# Patient Record
Sex: FEMALE | Race: WHITE | NOT HISPANIC OR LATINO
[De-identification: names, ages, dates, MRNs, and addresses within clinical notes are randomized per-mention and may not be internally consistent; named-entity substitution may affect disease eponyms.]

---
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
## 2025-03-14 NOTE — DATA REVIEWED
[de-identified] : My review and interpretation of the radiologic studies:\par 11/2/21, outside facility radiographs of the right ankle: within normal limits. Talar Dome is well reduced within ankle mortis. No medial clear-space widening. No osseous abnormalities. \par \par September 2021: XR Cervical Spine: AP and Lateral views (including flex/ex) demonstrate no obvious fractures or dislocations of the cervical spine. No evidence of instability is present.\par \par  ABDOMINAL PAIN

## 2025-05-27 ENCOUNTER — RX RENEWAL (OUTPATIENT)
Age: 20
End: 2025-05-27

## 2025-06-03 ENCOUNTER — APPOINTMENT (OUTPATIENT)
Dept: INTERNAL MEDICINE | Facility: CLINIC | Age: 20
End: 2025-06-03
Payer: COMMERCIAL

## 2025-06-03 ENCOUNTER — NON-APPOINTMENT (OUTPATIENT)
Age: 20
End: 2025-06-03

## 2025-06-03 VITALS
TEMPERATURE: 98.9 F | SYSTOLIC BLOOD PRESSURE: 125 MMHG | HEART RATE: 75 BPM | RESPIRATION RATE: 15 BRPM | BODY MASS INDEX: 19.84 KG/M2 | DIASTOLIC BLOOD PRESSURE: 85 MMHG | OXYGEN SATURATION: 96 % | HEIGHT: 63 IN | WEIGHT: 112 LBS

## 2025-06-03 VITALS — DIASTOLIC BLOOD PRESSURE: 72 MMHG | SYSTOLIC BLOOD PRESSURE: 118 MMHG

## 2025-06-03 DIAGNOSIS — Z00.00 ENCOUNTER FOR GENERAL ADULT MEDICAL EXAMINATION W/OUT ABNORMAL FINDINGS: ICD-10-CM

## 2025-06-03 DIAGNOSIS — Z98.890 OTHER SPECIFIED POSTPROCEDURAL STATES: ICD-10-CM

## 2025-06-03 DIAGNOSIS — Z82.49 FAMILY HISTORY OF ISCHEMIC HEART DISEASE AND OTHER DISEASES OF THE CIRCULATORY SYSTEM: ICD-10-CM

## 2025-06-03 DIAGNOSIS — R55 SYNCOPE AND COLLAPSE: ICD-10-CM

## 2025-06-03 PROCEDURE — 36415 COLL VENOUS BLD VENIPUNCTURE: CPT

## 2025-06-03 PROCEDURE — 99385 PREV VISIT NEW AGE 18-39: CPT

## 2025-06-04 DIAGNOSIS — D72.819 DECREASED WHITE BLOOD CELL COUNT, UNSPECIFIED: ICD-10-CM

## 2025-06-04 LAB
25(OH)D3 SERPL-MCNC: 46.5 NG/ML
ALBUMIN SERPL ELPH-MCNC: 4.7 G/DL
ALP BLD-CCNC: 73 U/L
ALT SERPL-CCNC: 19 U/L
ANION GAP SERPL CALC-SCNC: 15 MMOL/L
AST SERPL-CCNC: 21 U/L
BILIRUB SERPL-MCNC: 0.5 MG/DL
BUN SERPL-MCNC: 9 MG/DL
CALCIUM SERPL-MCNC: 9.9 MG/DL
CHLORIDE SERPL-SCNC: 103 MMOL/L
CHOLEST SERPL-MCNC: 172 MG/DL
CO2 SERPL-SCNC: 22 MMOL/L
CREAT SERPL-MCNC: 0.91 MG/DL
EGFRCR SERPLBLD CKD-EPI 2021: 93 ML/MIN/1.73M2
ESTIMATED AVERAGE GLUCOSE: 105 MG/DL
GLUCOSE SERPL-MCNC: 89 MG/DL
HBA1C MFR BLD HPLC: 5.3 %
HCT VFR BLD CALC: 41.5 %
HDLC SERPL-MCNC: 44 MG/DL
HGB BLD-MCNC: 13.7 G/DL
LDLC SERPL-MCNC: 108 MG/DL
MCHC RBC-ENTMCNC: 30.5 PG
MCHC RBC-ENTMCNC: 33 G/DL
MCV RBC AUTO: 92.4 FL
NONHDLC SERPL-MCNC: 128 MG/DL
PLATELET # BLD AUTO: 334 K/UL
POTASSIUM SERPL-SCNC: 4.4 MMOL/L
PROT SERPL-MCNC: 7.3 G/DL
RBC # BLD: 4.49 M/UL
RBC # FLD: 12.1 %
SODIUM SERPL-SCNC: 140 MMOL/L
TRIGL SERPL-MCNC: 110 MG/DL
TSH SERPL-ACNC: 2.18 UIU/ML
WBC # FLD AUTO: 3.58 K/UL

## 2025-06-20 ENCOUNTER — NON-APPOINTMENT (OUTPATIENT)
Age: 20
End: 2025-06-20

## 2025-06-20 ENCOUNTER — APPOINTMENT (OUTPATIENT)
Dept: OBGYN | Facility: CLINIC | Age: 20
End: 2025-06-20

## 2025-06-20 VITALS
DIASTOLIC BLOOD PRESSURE: 70 MMHG | HEIGHT: 63 IN | SYSTOLIC BLOOD PRESSURE: 120 MMHG | WEIGHT: 113 LBS | BODY MASS INDEX: 20.02 KG/M2

## 2025-06-20 PROCEDURE — 99395 PREV VISIT EST AGE 18-39: CPT

## 2025-06-20 PROCEDURE — 99459 PELVIC EXAMINATION: CPT

## 2025-06-23 LAB
C TRACH RRNA SPEC QL NAA+PROBE: NOT DETECTED
N GONORRHOEA RRNA SPEC QL NAA+PROBE: NOT DETECTED
SOURCE AMPLIFICATION: NORMAL
SOURCE AMPLIFICATION: NORMAL
T VAGINALIS RRNA SPEC QL NAA+PROBE: NOT DETECTED